# Patient Record
Sex: MALE
[De-identification: names, ages, dates, MRNs, and addresses within clinical notes are randomized per-mention and may not be internally consistent; named-entity substitution may affect disease eponyms.]

---

## 2017-10-01 ENCOUNTER — HOSPITAL ENCOUNTER (EMERGENCY)
Dept: HOSPITAL 31 - C.ER | Age: 37
Discharge: HOME | End: 2017-10-01
Payer: SELF-PAY

## 2017-10-01 VITALS — OXYGEN SATURATION: 100 %

## 2017-10-01 VITALS — SYSTOLIC BLOOD PRESSURE: 112 MMHG | HEART RATE: 73 BPM | DIASTOLIC BLOOD PRESSURE: 73 MMHG | TEMPERATURE: 98.9 F

## 2017-10-01 VITALS — RESPIRATION RATE: 16 BRPM

## 2017-10-01 DIAGNOSIS — M54.5: Primary | ICD-10-CM

## 2017-10-01 PROCEDURE — 99283 EMERGENCY DEPT VISIT LOW MDM: CPT

## 2017-10-01 PROCEDURE — 96372 THER/PROPH/DIAG INJ SC/IM: CPT

## 2017-10-01 NOTE — C.PDOC
History Of Present Illness





36-year-old male, presents to the emergency department with complaints of three-

day duration of low back pain, that is intermittent in nature, and occasionally 

radiating to left leg. Patient denies nausea/vomiting, fevers or chills. No 

other complaints.


Time Seen by Provider: 10/01/17 08:25


Chief Complaint (Nursing): Back Pain


History Per: Patient


History/Exam Limitations: no limitations


Onset/Duration Of Symptoms: Days


Current Symptoms Are (Timing): Still Present





Past Medical History


Reviewed: Historical Data, Nursing Documentation, Vital Signs


Vital Signs: 


 Last Vital Signs











Temp  98.9 F   10/01/17 10:36


 


Pulse  73   10/01/17 10:36


 


Resp  16   10/01/17 10:36


 


BP  112/73   10/01/17 10:36


 


Pulse Ox  100   10/01/17 10:59














- Medical History


PMH: 


   Denies: Chronic Kidney Disease


Family History: States: No Known Family Hx





- Social History


Hx Alcohol Use: No


Hx Substance Use: No





- Immunization History


Hx Tetanus Toxoid Vaccination: No


Hx Influenza Vaccination: Yes


Hx Pneumococcal Vaccination: No





Review Of Systems


Except As Marked, All Systems Reviewed And Found Negative.


Constitutional: Negative for: Fever, Chills


Respiratory: Negative for: Cough, Shortness of Breath


Gastrointestinal: Negative for: Nausea, Vomiting


Musculoskeletal: Positive for: Back Pain, Leg Pain





Physical Exam





- Physical Exam


Appears: Non-toxic, No Acute Distress


Skin: Warm, Dry, No Rash


Head: Atraumatic, Normacephalic


Eye(s): bilateral: Normal Inspection


Nose: Normal


Oral Mucosa: Moist


Lips: Normal Appearing


Neck: Normal ROM, No Midline Cervical Tenderness, No Paracervical Tenderness, 

Supple


Cardiovascular: Rhythm Regular, No Murmur


Respiratory: Normal Breath Sounds, No Accessory Muscle Use


Gastrointestinal/Abdominal: Soft, No Tenderness


Extremity: Normal ROM


Neurological/Psych: Oriented x3, Normal Speech, Normal Motor, Normal Sensation


Gait: Steady





ED Course And Treatment


O2 Sat by Pulse Oximetry: 100 (on RA)


Pulse Ox Interpretation: Normal





Medical Decision Making


Medical Decision Making: 


On re-eval, the patient reports improvement of symptoms. Lungs are CTA, heart 

is RRR, ambulatory in the ED with steady. Abdomen is soft, non-tender and the 

patient is tolerating PO well.  Follow up with the medical doctor/clinic within 

1-2 days. Return if worsened. 





Disposition





- Disposition


Referrals: 


[facility] [Outside]


Disposition: HOME/ ROUTINE


Disposition Time: 10:00


Condition: GOOD


Additional Instructions: 


Follow up with the medical doctor/clinic within 1-2 days. return if worsened. 


Prescriptions: 


Cyclobenzaprine [Cyclobenzaprine HCl] 10 mg PO BID PRN #20 tab


 PRN Reason: Muscle Spasm


Naproxen [Naprosyn] 500 mg PO BID #20 tab


Instructions:  Lumbar Disc Herniation (ED)


Forms:  [facility] (English)


Print Language: Nepalese





- Clinical Impression


Clinical Impression: 


 Low back pain








- Scribe Statement


The provider has reviewed the documentation as recorded by the Scribe (Humphrey Herbert)


All medical record entries made by the Scribe were at my direction and 

personally dictated by me. I have reviewed the chart and agree that the record 

accurately reflects my personal performance of the history, physical exam, 

medical decision making, and the department course for this patient. I have 

also personally directed, reviewed, and agree with the discharge instructions 

and disposition.

## 2018-02-12 ENCOUNTER — HOSPITAL ENCOUNTER (EMERGENCY)
Dept: HOSPITAL 31 - C.ER | Age: 38
Discharge: HOME | End: 2018-02-12
Payer: COMMERCIAL

## 2018-02-12 VITALS
DIASTOLIC BLOOD PRESSURE: 84 MMHG | HEART RATE: 76 BPM | TEMPERATURE: 98 F | OXYGEN SATURATION: 98 % | RESPIRATION RATE: 18 BRPM | SYSTOLIC BLOOD PRESSURE: 125 MMHG

## 2018-02-12 VITALS — BODY MASS INDEX: 23.1 KG/M2

## 2018-02-12 DIAGNOSIS — J11.1: Primary | ICD-10-CM

## 2018-02-12 NOTE — C.PDOC
History Of Present Illness


38 y/o male presents to the ER complaining of flu like symptoms including 

subjective fever for the past 4 days. Patient denies having chest pain and SOB. 

Patient also denies any sick contacts.


Chief Complaint (Nursing): Flu-like Symptoms


History Per: Patient


History/Exam Limitations: no limitations


Onset/Duration Of Symptoms: Days


Current Symptoms Are (Timing): Still Present


Associated Symptoms: Fever


Severity: Moderate





Past Medical History


Reviewed: Historical Data, Nursing Documentation, Vital Signs


Vital Signs: 


 Last Vital Signs











Temp  98.0 F   02/12/18 13:55


 


Pulse  76   02/12/18 13:55


 


Resp  18   02/12/18 13:55


 


BP  125/84   02/12/18 13:55


 


Pulse Ox  98   02/12/18 18:49














- Medical History


PMH: No Chronic Diseases


   Denies: Chronic Kidney Disease


Surgical History: No Surg Hx


Family History: States: No Known Family Hx





- Social History


Hx Alcohol Use: No


Hx Substance Use: No





- Immunization History


Hx Tetanus Toxoid Vaccination: No


Hx Influenza Vaccination: Yes


Hx Pneumococcal Vaccination: No





Review Of Systems


Except As Marked, All Systems Reviewed And Found Negative.


Constitutional: Positive for: Fever (subjective fever)


Cardiovascular: Negative for: Chest Pain


Respiratory: Negative for: Shortness of Breath





Physical Exam





- Physical Exam


Appears: Non-toxic, No Acute Distress


Skin: Normal Color, Warm


Head: Atraumatic, Normacephalic


Eye(s): bilateral: Normal Inspection


Ear(s): Bilateral: Normal


Nose: Normal


Oral Mucosa: Moist


Throat: Normal, No Erythema, No Exudate


Neck: Supple


Chest: Symmetrical


Cardiovascular: Rhythm Regular


Respiratory: Normal Breath Sounds, No Accessory Muscle Use, No Rales, No Rhonchi

, No Wheezing


Extremity: Normal ROM


Neurological/Psych: Oriented x3, Normal Speech, Normal Motor, Normal Sensation





ED Course And Treatment


O2 Sat by Pulse Oximetry: 98 (RA)


Pulse Ox Interpretation: Normal


Progress Note: Patient discharged with prescriptions of Motrin and Tamiflu. 

Patient told to follow up with clinic this week for re-evaluation.





Disposition





- Disposition


Referrals: 


[facility] [Outside]


[facility] [Outside]


Disposition: HOME/ ROUTINE


Disposition Time: 15:00


Condition: GOOD


Additional Instructions: 





Thank you for letting us take care of you today. The emergency medical care you 

received today was directed at your acute symptoms. If you were prescribed any 

medication, please fill it and take as directed. It may take several days for 

your symptoms to resolve. Return to the Emergency Department if your symptoms 

worsen, do not improve, or if you have any other problems.





Please contact your doctor or call one of the physicians/clinics you have been 

referred to that are listed on the Patient Visit Information form that is 

included in your discharge packet. Bring any paperwork you were given at 

discharge with you along with any medications you are taking to your follow up 

visit. Our treatment cannot replace ongoing medical care by a primary care 

provider (PCP) outside of the emergency department.





Thank you for allowing the [facility] team to be part of your care today.

















Follow up with in the clinic this week for re-evaluation and further management.











Braden por dejarnos atenderlo hoy. La atencin mdica de emergencia que recibi

 hoy estaba dirigida a lizeth sntomas agudos. Si le prescribieron algn 

medicamento, llnelo y tome segn las indicaciones. Lizeth sntomas pueden tardar 

varios eisenberg en resolverse. Regrese al Departamento de Emergencia si lizeth s

ntomas empeoran, no mejoran o si tiene algn otro problema.





Comunquese con [place] mdico o llame a eyal de los mdicos / clnicas a los que ha 

sido referido que figura en el formulario de Informacin de visita del paciente 

que se incluye en [place] paquete de aniceto. Traiga todos los documentos que recibi 

al momento del aniceto junto con los medicamentos que est tomando en [place] visita de 

seguimiento. Nuestro tratamiento no puede reemplazar la atencin mdica en 

curso por parte de un proveedor de atencin primaria (PCP) fuera del 

departamento de emergencias.





Braden por permitir que el equipo de [facility] sea parte de [place] cuidado 

hoy.

















Brock un seguimiento en la clnica esta semana para hoang reevaluacin y 

administracin adicional.


Prescriptions: 


Ibuprofen [Motrin] 600 mg PO Q6 PRN #20 tab


 PRN Reason: Pain, Moderate (4-7)


Oseltamivir Phosphate [Tamiflu] 75 mg PO BID #10 capsule


Instructions:  Viral Syndrome (ED)


Forms:  Gen Discharge Inst [employer]


Print Language: Honduran





- Clinical Impression


Clinical Impression: 


 Influenza-like illness








- Scribe Statement


The provider has reviewed the documentation as recorded by the Marifer Avina


Provider Attestation: 





All medical record entries made by the Marifer were at my direction and 

personally dictated by me. I have reviewed the chart and agree that the record 

accurately reflects my personal performance of the history, physical exam, 

medical decision making, and the department course for this patient. I have 

also personally directed, reviewed, and agree with the discharge instructions 

and disposition.

## 2018-05-17 ENCOUNTER — HOSPITAL ENCOUNTER (EMERGENCY)
Dept: HOSPITAL 31 - C.ER | Age: 38
Discharge: HOME | End: 2018-05-17
Payer: COMMERCIAL

## 2018-05-17 VITALS — DIASTOLIC BLOOD PRESSURE: 78 MMHG | HEART RATE: 90 BPM | TEMPERATURE: 98.4 F | SYSTOLIC BLOOD PRESSURE: 126 MMHG

## 2018-05-17 VITALS — RESPIRATION RATE: 18 BRPM

## 2018-05-17 VITALS — OXYGEN SATURATION: 96 %

## 2018-05-17 VITALS — BODY MASS INDEX: 23.1 KG/M2

## 2018-05-17 DIAGNOSIS — E11.9: Primary | ICD-10-CM

## 2018-05-17 DIAGNOSIS — J45.901: ICD-10-CM

## 2018-05-17 LAB
BASOPHILS # BLD AUTO: 0 K/UL (ref 0–0.2)
BASOPHILS NFR BLD: 0.2 % (ref 0–2)
BILIRUB UR-MCNC: NEGATIVE MG/DL
BUN SERPL-MCNC: 9 MG/DL (ref 9–20)
CALCIUM SERPL-MCNC: 9.8 MG/DL (ref 8.6–10.4)
EOSINOPHIL # BLD AUTO: 0 K/UL (ref 0–0.7)
EOSINOPHIL NFR BLD: 0.3 % (ref 0–4)
ERYTHROCYTE [DISTWIDTH] IN BLOOD BY AUTOMATED COUNT: 12.5 % (ref 11.5–14.5)
GFR NON-AFRICAN AMERICAN: > 60
GLUCOSE UR STRIP-MCNC: (no result) MG/DL
HGB BLD-MCNC: 16.2 G/DL (ref 12–18)
LEUKOCYTE ESTERASE UR-ACNC: (no result) LEU/UL
LYMPHOCYTES # BLD AUTO: 1.6 K/UL (ref 1–4.3)
LYMPHOCYTES NFR BLD AUTO: 19.1 % (ref 20–40)
MCH RBC QN AUTO: 29.1 PG (ref 27–31)
MCHC RBC AUTO-ENTMCNC: 35.3 G/DL (ref 33–37)
MCV RBC AUTO: 82.4 FL (ref 80–94)
MONOCYTES # BLD: 0.6 K/UL (ref 0–0.8)
MONOCYTES NFR BLD: 7.7 % (ref 0–10)
NEUTROPHILS # BLD: 6.1 K/UL (ref 1.8–7)
NEUTROPHILS NFR BLD AUTO: 72.7 % (ref 50–75)
NRBC BLD AUTO-RTO: 0.1 % (ref 0–2)
PH UR STRIP: 5 [PH] (ref 5–8)
PLATELET # BLD: 193 K/UL (ref 130–400)
PMV BLD AUTO: 8.7 FL (ref 7.2–11.7)
PROT UR STRIP-MCNC: NEGATIVE MG/DL
RBC # BLD AUTO: 5.57 MIL/UL (ref 4.4–5.9)
RBC # UR STRIP: NEGATIVE /UL
SP GR UR STRIP: 1.03 (ref 1–1.03)
SQUAMOUS EPITHIAL: < 1 /HPF (ref 0–5)
UROBILINOGEN UR-MCNC: 2 MG/DL (ref 0.2–1)
WBC # BLD AUTO: 8.3 K/UL (ref 4.8–10.8)

## 2018-05-17 PROCEDURE — 87804 INFLUENZA ASSAY W/OPTIC: CPT

## 2018-05-17 PROCEDURE — 96361 HYDRATE IV INFUSION ADD-ON: CPT

## 2018-05-17 PROCEDURE — 96365 THER/PROPH/DIAG IV INF INIT: CPT

## 2018-05-17 PROCEDURE — 99284 EMERGENCY DEPT VISIT MOD MDM: CPT

## 2018-05-17 PROCEDURE — 87040 BLOOD CULTURE FOR BACTERIA: CPT

## 2018-05-17 PROCEDURE — 71046 X-RAY EXAM CHEST 2 VIEWS: CPT

## 2018-05-17 PROCEDURE — 81001 URINALYSIS AUTO W/SCOPE: CPT

## 2018-05-17 PROCEDURE — 85025 COMPLETE CBC W/AUTO DIFF WBC: CPT

## 2018-05-17 PROCEDURE — 96375 TX/PRO/DX INJ NEW DRUG ADDON: CPT

## 2018-05-17 PROCEDURE — 80048 BASIC METABOLIC PNL TOTAL CA: CPT

## 2018-05-17 NOTE — C.PDOC
History Of Present Illness


38 y/o male with history of NIDDM, asthma presents to ED for evaluation of 

malaise, low grade fever, nasal congestion, dry cough, weakness and 

lightheadedness for 6 days. Patient states she took Tylenol with no relief. 

Patient reports he is on metformin and is compliant with medication. Otherwise, 

pt denies high fever, severe headache, vertigo, visual changes, drooling, 

dysphagia, dyspnea, CP, SOB, palpitation, abd. pain, V/D, UTi sx, denies recent 

travel or known sick contact. Ambulate to Ed for evaluation, not in nay 

apparent distress.


Time Seen by Provider: 05/17/18 12:24


Chief Complaint (Nursing): Cough, Cold, Congestion


History Per: Patient


History/Exam Limitations: no limitations


Onset/Duration Of Symptoms: Days


Current Symptoms Are (Timing): Still Present





Past Medical History


Reviewed: Historical Data, Nursing Documentation, Vital Signs


Vital Signs: 


 Last Vital Signs











Temp  98.4 F   05/17/18 16:40


 


Pulse  90   05/17/18 16:40


 


Resp  18   05/17/18 16:40


 


BP  126/78   05/17/18 16:40


 


Pulse Ox  100   05/17/18 16:40














- Medical History


PMH: Diabetes


Surgical History: No Surg Hx


Family History: States: No Known Family Hx





- Social History


Hx Alcohol Use: No


Hx Substance Use: No





- Immunization History


Hx Tetanus Toxoid Vaccination: Yes


Hx Influenza Vaccination: No


Hx Pneumococcal Vaccination: No





Review Of Systems


Constitutional: Positive for: Fever, Weakness


Cardiovascular: Positive for: Light Headedness.  Negative for: Chest Pain


Respiratory: Positive for: Cough.  Negative for: Shortness of Breath


Gastrointestinal: Negative for: Nausea, Vomiting, Diarrhea


Skin: Negative for: Rash





Physical Exam





- Physical Exam


Appears: Non-toxic, No Acute Distress


Skin: Warm, Dry, No Rash


Head: Normacephalic


Eye(s): bilateral: PERRL


Ear(s): Bilateral: Normal


Nose: No Flaring, No Discharge


Oral Mucosa: Moist, No Drooling


Tongue: Normal Appearing


Lips: Normal Appearing


Throat: Erythema, No Exudate, No Drooling


Neck: Trachea Midline, Supple


Cardiovascular: Rhythm Regular


Respiratory: No Decreased Breath Sounds, No Accessory Muscle Use, No Rales, No 

Rhonchi, No Stridor, Wheezing (diffuse bilateral expiratory )


Gastrointestinal/Abdominal: Soft, No Tenderness, No Distention, No Guarding, No 

Rebound


Back: No CVA Tenderness


Extremity: Normal ROM, No Pedal Edema, Capillary Refill (<2 seconds)


Neurological/Psych: Oriented x3, Normal Speech, Normal Cognition





ED Course And Treatment





- Laboratory Results


Result Diagrams: 


 05/17/18 12:31





 05/17/18 12:31


Lab Interpretation: No Acute Changes


O2 Sat by Pulse Oximetry: 96 (RA)


Pulse Ox Interpretation: Normal





- Radiology


CXR: Interpreted by Me, Viewed By Me, Read By Radiologist


CXR Interpretation: Yes: No Acute Disease


Progress Note: Pt was OBS in ED for 5 hours and reports moderate improvement in 

sx.  AAO#3, not in any apparent distress.  On re-evaluation, pt is afebrile, 

hemodynamiclay stable.  Non-toxic, tolerate Po well in ED. PulsEOx 95% RA Neck; 

Supple, (-) meningeal sign. ENT: no acute findings. Lungs: (+) CTA B/L, BS 

equal B/L.  Abd: benign, (-) guarding, (-) rebound. back: (-) CVA tenderness.  

neurologicaly intact. Blood work review, no leukocytosis. CMP- no acute 

abnormalities. AG=13.  CXR- no acute findings. Influenza (-).  Pt has clinical 

findings c/w  acute bronchitis with asthma exacerbation, hx of NIDDM. Pt 

advised.  ref. to F/u with PMD in 2-3 days for re-eavl.  return to Ed if any 

worsening or new changes.





Disposition


Counseled Patient/Family Regarding: Studies Performed, Diagnosis, Need For 

Followup, Rx Given





- Disposition


Referrals: 


[facility] [Outside]


Disposition: HOME/ ROUTINE


Disposition Time: 15:50


Condition: STABLE


Additional Instructions: 


Encourage fluids


take medication as prescribed


follow up with PMD in 2 days for re-evaluation.


return to ED if any worsening or new changes.


Prescriptions: 


Albuterol HFA [Ventolin HFA 90 mcg/actuation (8 g)] 1 puff IH Q6 #1 inhaler


Cefdinir [Omnicef] 300 mg PO BID #14 cap


Prednisone [Deltasone] 60 mg PO DAILY #9 tablet


Instructions:  Asthma in Adults, Acute Bronchitis, Diabetes Type 2 (DC)


Forms:  [employer] ([employer]) (English)


Print Language: Danish





- Clinical Impression


Clinical Impression: 


 Diabetes type 2, controlled, Bronchitis, Asthma exacerbation








- PA / NP / Resident Statement


MD/DO has reviewed & agrees with the documentation as recorded.





- Scribe Statement


The provider has reviewed the documentation as recorded by the Marifer Carter





All medical record entries made by the Marifer were at my direction and 

personally dictated by me. I have reviewed the chart and agree that the record 

accurately reflects my personal performance of the history, physical exam, 

medical decision making, and the department course for this patient. I have 

also personally directed, reviewed, and agree with the discharge instructions 

and disposition.

## 2018-05-17 NOTE — RAD
HISTORY:

SOB  



COMPARISON:

No prior.



TECHNIQUE:

Chest PA and lateral



FINDINGS:



LUNGS:

Bilateral upper lobe Fibronodular changes, scarring, volume loss left 

lung. Most likely etiology is granulomatous disease. 



PLEURA:

Left apical pleural thickening.



CARDIOVASCULAR:

Normal.



OSSEOUS STRUCTURES:

No significant abnormalities.



VISUALIZED UPPER ABDOMEN:

Normal.



OTHER FINDINGS:

None.



IMPRESSION:

Fibronodular changes, biapical scarring.  Volume loss left upper 

lobe. The overall appearance suggests prior exposure to granulomatous 

disease. There are no comparison studies.